# Patient Record
Sex: FEMALE | Race: WHITE | Employment: FULL TIME | URBAN - METROPOLITAN AREA
[De-identification: names, ages, dates, MRNs, and addresses within clinical notes are randomized per-mention and may not be internally consistent; named-entity substitution may affect disease eponyms.]

---

## 2020-07-15 ENCOUNTER — PROCEDURE VISIT (OUTPATIENT)
Dept: UROLOGY | Age: 55
End: 2020-07-15
Payer: COMMERCIAL

## 2020-07-15 VITALS
SYSTOLIC BLOOD PRESSURE: 103 MMHG | HEIGHT: 68 IN | HEART RATE: 66 BPM | BODY MASS INDEX: 23.34 KG/M2 | WEIGHT: 154 LBS | DIASTOLIC BLOOD PRESSURE: 68 MMHG

## 2020-07-15 PROCEDURE — 97750 PHYSICAL PERFORMANCE TEST: CPT | Performed by: NURSE PRACTITIONER

## 2020-07-15 PROCEDURE — 51784 ANAL/URINARY MUSCLE STUDY: CPT | Performed by: NURSE PRACTITIONER

## 2020-07-15 PROCEDURE — 97032 APPL MODALITY 1+ESTIM EA 15: CPT | Performed by: NURSE PRACTITIONER

## 2020-07-15 PROCEDURE — 91122 PR ANAL PRESSURE RECORD: CPT | Performed by: NURSE PRACTITIONER

## 2020-07-15 RX ORDER — MELOXICAM 15 MG/1
TABLET ORAL
COMMUNITY
Start: 2020-07-13 | End: 2020-11-23 | Stop reason: SDUPTHER

## 2020-07-15 RX ORDER — M-VIT,TX,IRON,MINS/CALC/FOLIC 27MG-0.4MG
1 TABLET ORAL DAILY
COMMUNITY

## 2020-07-15 RX ORDER — CETIRIZINE HYDROCHLORIDE 10 MG/1
TABLET ORAL
COMMUNITY
End: 2021-02-04

## 2020-07-15 RX ORDER — ASCORBIC ACID 500 MG
500 TABLET ORAL DAILY
COMMUNITY
End: 2021-12-08 | Stop reason: ALTCHOICE

## 2020-07-15 RX ORDER — GABAPENTIN 100 MG/1
100 CAPSULE ORAL DAILY
COMMUNITY
Start: 2020-07-13 | End: 2021-12-08 | Stop reason: ALTCHOICE

## 2020-07-15 SDOH — HEALTH STABILITY: MENTAL HEALTH: HOW OFTEN DO YOU HAVE A DRINK CONTAINING ALCOHOL?: NEVER

## 2020-07-22 ENCOUNTER — PROCEDURE VISIT (OUTPATIENT)
Dept: UROLOGY | Age: 55
End: 2020-07-22
Payer: COMMERCIAL

## 2020-07-22 VITALS
SYSTOLIC BLOOD PRESSURE: 120 MMHG | TEMPERATURE: 97.7 F | BODY MASS INDEX: 23.42 KG/M2 | WEIGHT: 154 LBS | DIASTOLIC BLOOD PRESSURE: 77 MMHG

## 2020-07-22 PROCEDURE — 91122 PR ANAL PRESSURE RECORD: CPT | Performed by: NURSE PRACTITIONER

## 2020-07-22 PROCEDURE — 51784 ANAL/URINARY MUSCLE STUDY: CPT | Performed by: NURSE PRACTITIONER

## 2020-07-22 PROCEDURE — 97750 PHYSICAL PERFORMANCE TEST: CPT | Performed by: NURSE PRACTITIONER

## 2020-07-22 PROCEDURE — 97032 APPL MODALITY 1+ESTIM EA 15: CPT | Performed by: NURSE PRACTITIONER

## 2020-07-29 ENCOUNTER — PROCEDURE VISIT (OUTPATIENT)
Dept: UROLOGY | Age: 55
End: 2020-07-29
Payer: COMMERCIAL

## 2020-07-29 VITALS
SYSTOLIC BLOOD PRESSURE: 100 MMHG | DIASTOLIC BLOOD PRESSURE: 74 MMHG | WEIGHT: 157 LBS | TEMPERATURE: 97.7 F | BODY MASS INDEX: 23.87 KG/M2

## 2020-07-29 PROCEDURE — 91122 PR ANAL PRESSURE RECORD: CPT | Performed by: NURSE PRACTITIONER

## 2020-07-29 PROCEDURE — 51784 ANAL/URINARY MUSCLE STUDY: CPT | Performed by: NURSE PRACTITIONER

## 2020-07-29 PROCEDURE — 97750 PHYSICAL PERFORMANCE TEST: CPT | Performed by: NURSE PRACTITIONER

## 2020-07-29 PROCEDURE — 97032 APPL MODALITY 1+ESTIM EA 15: CPT | Performed by: NURSE PRACTITIONER

## 2020-07-29 NOTE — PROGRESS NOTES
PELVIC FLOOR REHAB FOLLOW UP    At last visit (PFR # 2, 1 weeks ago): Was able to hold for 4 seconds at weak amplitude. Fatigue after 3 repetitions.      Home exercise regimen prescribed:   Repetitions - 6   Contract - 6 sec   Relax - 10 sec   + 10 Quick Flicks  Frequency- 4 times daily     Stimulated with 62 average mA for 10 minutes.      Symptoms  Daytime voiding frequency: q 3-4 hrs, unchanged  Nighttime voiding frequency: 1 times per night, unchanged  Urgency: mild-moderste decreased    Incontinence episodes per day: dribbles, Causes: not sure of cause, unchanged  Number of pads used per day: pantyliner per day, unchanged    Pelvic pain: some abdominal discomfort from exercises    Bowel habits: unchanged  Fecal incontinence: unchanged    OVERALL IMPROVEMENT SINCE INITIAL SESSION:   minimal in degree    Compliance:  Has pt completed exercises as instructed: Yes    Changes Since Initial Visit  Intake of spicy/citrus/tomato based foods: unchanged  Caffeine intake per day: unchanged  Fluid intake per day: unchanged  Alcohol intake: unchanged  Smoking: unchanged    Was able to hold for 6 seconds at weak amplitude. Fatigue after 7 repetitions. Home exercise regimen prescribed:   Repetitions - 6   Contract - 8 sec   Relax - 10 sec   + 10 Quick Flicks  Frequency- 4 times daily    Stimulated with 59 average mA for 12 minutes.

## 2020-08-05 ENCOUNTER — PROCEDURE VISIT (OUTPATIENT)
Dept: UROLOGY | Age: 55
End: 2020-08-05
Payer: COMMERCIAL

## 2020-08-05 VITALS
SYSTOLIC BLOOD PRESSURE: 104 MMHG | BODY MASS INDEX: 24.02 KG/M2 | TEMPERATURE: 97.9 F | WEIGHT: 158 LBS | DIASTOLIC BLOOD PRESSURE: 69 MMHG | HEART RATE: 69 BPM

## 2020-08-05 PROCEDURE — 51784 ANAL/URINARY MUSCLE STUDY: CPT | Performed by: PHYSICIAN ASSISTANT

## 2020-08-05 PROCEDURE — 97032 APPL MODALITY 1+ESTIM EA 15: CPT | Performed by: PHYSICIAN ASSISTANT

## 2020-08-05 PROCEDURE — 91122 PR ANAL PRESSURE RECORD: CPT | Performed by: PHYSICIAN ASSISTANT

## 2020-08-05 PROCEDURE — 97750 PHYSICAL PERFORMANCE TEST: CPT | Performed by: PHYSICIAN ASSISTANT

## 2020-08-12 ENCOUNTER — PROCEDURE VISIT (OUTPATIENT)
Dept: UROLOGY | Age: 55
End: 2020-08-12
Payer: COMMERCIAL

## 2020-08-12 VITALS
BODY MASS INDEX: 24.02 KG/M2 | HEART RATE: 60 BPM | DIASTOLIC BLOOD PRESSURE: 65 MMHG | SYSTOLIC BLOOD PRESSURE: 109 MMHG | WEIGHT: 158 LBS

## 2020-08-12 PROCEDURE — 91122 PR ANAL PRESSURE RECORD: CPT | Performed by: PHYSICIAN ASSISTANT

## 2020-08-12 PROCEDURE — 97032 APPL MODALITY 1+ESTIM EA 15: CPT | Performed by: PHYSICIAN ASSISTANT

## 2020-08-12 PROCEDURE — 51784 ANAL/URINARY MUSCLE STUDY: CPT | Performed by: PHYSICIAN ASSISTANT

## 2020-08-12 PROCEDURE — 97750 PHYSICAL PERFORMANCE TEST: CPT | Performed by: PHYSICIAN ASSISTANT

## 2020-08-26 ENCOUNTER — PROCEDURE VISIT (OUTPATIENT)
Dept: UROLOGY | Age: 55
End: 2020-08-26
Payer: COMMERCIAL

## 2020-08-26 VITALS
HEART RATE: 73 BPM | SYSTOLIC BLOOD PRESSURE: 109 MMHG | DIASTOLIC BLOOD PRESSURE: 67 MMHG | BODY MASS INDEX: 24.02 KG/M2 | WEIGHT: 158 LBS

## 2020-08-26 PROCEDURE — 51784 ANAL/URINARY MUSCLE STUDY: CPT | Performed by: NURSE PRACTITIONER

## 2020-08-26 PROCEDURE — 97032 APPL MODALITY 1+ESTIM EA 15: CPT | Performed by: NURSE PRACTITIONER

## 2020-08-26 PROCEDURE — 97750 PHYSICAL PERFORMANCE TEST: CPT | Performed by: NURSE PRACTITIONER

## 2020-08-26 PROCEDURE — 91122 PR ANAL PRESSURE RECORD: CPT | Performed by: NURSE PRACTITIONER

## 2020-08-26 NOTE — PROGRESS NOTES
PELVIC FLOOR REHAB FOLLOW UP    At last visit (PFR # 5, 2 weeks ago): Was able to hold for 8 seconds at moderate wavy amplitude. Fatigue after 7 repetitions.      Home exercise regimen prescribed:   Repetitions - 8  Contract - 8 sec   Relax - 89 CPC   + 69 OLXMT Flicks  Frequency- 4 times daily     Stimulated with 53 average mA for 15 minutes.     Symptoms  Daytime voiding frequency: q 3-4 hrs, unchanged  Nighttime voiding frequency: 0-1 times per night, decreased  Urgency: mild, decreased    Incontinence episodes per day: 0, Causes: none, decreased  Number of pads used per day: 1 for protection, decreased    Pelvic pain: unchanged    Bowel habits: unchanged  Fecal incontinence: unchanged    OVERALL IMPROVEMENT SINCE INITIAL SESSION:   mild in degree    Compliance:  Has pt completed exercises as instructed: Yes    Changes Since Initial Visit  Intake of spicy/citrus/tomato based foods: unchanged  Caffeine intake per day: unchanged  Fluid intake per day: decreased  Alcohol intake: unchanged  Smoking: unchanged      Was able to hold for 5-6 seconds at weak amplitude. Significant use of abdominal muscles. Fatigue after 6 repetitions. Home exercise regimen prescribed:   Repetitions - 8   Contract - 6 sec   Relax - 10 sec   + 10 Quick Flicks  Frequency- 4 times daily    Stimulated with 41 average mA for 15 minutes.

## 2020-09-02 ENCOUNTER — PROCEDURE VISIT (OUTPATIENT)
Dept: UROLOGY | Age: 55
End: 2020-09-02
Payer: COMMERCIAL

## 2020-09-02 VITALS
SYSTOLIC BLOOD PRESSURE: 110 MMHG | DIASTOLIC BLOOD PRESSURE: 68 MMHG | BODY MASS INDEX: 24.02 KG/M2 | HEART RATE: 67 BPM | WEIGHT: 158 LBS

## 2020-09-02 PROCEDURE — 97750 PHYSICAL PERFORMANCE TEST: CPT | Performed by: NURSE PRACTITIONER

## 2020-09-02 PROCEDURE — 97032 APPL MODALITY 1+ESTIM EA 15: CPT | Performed by: NURSE PRACTITIONER

## 2020-09-02 PROCEDURE — 91122 PR ANAL PRESSURE RECORD: CPT | Performed by: NURSE PRACTITIONER

## 2020-09-02 PROCEDURE — 51784 ANAL/URINARY MUSCLE STUDY: CPT | Performed by: NURSE PRACTITIONER

## 2020-09-02 NOTE — PROGRESS NOTES
PELVIC FLOOR REHAB FOLLOW UP    At last visit (PFR # 6, 1 weeks ago): Was able to hold for 5-6 seconds at weak amplitude. Significant use of abdominal muscles. Fatigue after 6 repetitions.      Home exercise regimen prescribed:   Repetitions - 8   Contract - 6 sec   Relax - 10 sec   + 10 Quick Flicks  Frequency- 4 times daily     Stimulated with 41 average mA for 15 minutes.        Symptoms  Daytime voiding frequency: q 3-4 hrs, unchanged  Nighttime voiding frequency: 0-1 times per night, unchanged  Urgency: mild, unchanged    Incontinence episodes per day: 0, Causes: none, decreased  Number of pads used per day: 1, pantyliner unchanged    Pelvic pain: unchanged    Bowel habits: unchanged  Fecal incontinence: unchanged    OVERALL IMPROVEMENT SINCE INITIAL SESSION:   minimal in degree    Compliance:  Has pt completed exercises as instructed: Yes    Changes Since Initial Visit  Intake of spicy/citrus/tomato based foods: unchanged  Caffeine intake per day: unchanged  Fluid intake per day: unchanged  Alcohol intake: unchanged  Smoking: unchanged      Was able to hold for 6-8 seconds at weak-moderate amplitude, less abdominal muscles  Fatigue after 9 repetitions. Home exercise regimen prescribed:   Repetitions - 10   Contract - 8 sec   Relax - 10 sec   + 10 Quick Flicks  Frequency- 4 times daily    Stimulated with 69 average mA for 15 minutes.

## 2020-09-16 ENCOUNTER — PROCEDURE VISIT (OUTPATIENT)
Dept: UROLOGY | Age: 55
End: 2020-09-16
Payer: COMMERCIAL

## 2020-09-16 VITALS — BODY MASS INDEX: 24.02 KG/M2 | SYSTOLIC BLOOD PRESSURE: 106 MMHG | WEIGHT: 158 LBS | DIASTOLIC BLOOD PRESSURE: 68 MMHG

## 2020-09-16 PROCEDURE — 51784 ANAL/URINARY MUSCLE STUDY: CPT | Performed by: NURSE PRACTITIONER

## 2020-09-16 PROCEDURE — 97032 APPL MODALITY 1+ESTIM EA 15: CPT | Performed by: NURSE PRACTITIONER

## 2020-09-16 PROCEDURE — 97750 PHYSICAL PERFORMANCE TEST: CPT | Performed by: NURSE PRACTITIONER

## 2020-09-16 PROCEDURE — 91122 PR ANAL PRESSURE RECORD: CPT | Performed by: NURSE PRACTITIONER

## 2020-09-16 RX ORDER — METHYLPREDNISOLONE 4 MG/1
TABLET ORAL
COMMUNITY
Start: 2020-09-15 | End: 2020-12-20

## 2020-12-16 PROBLEM — M51.36 DDD (DEGENERATIVE DISC DISEASE), LUMBAR: Status: ACTIVE | Noted: 2020-12-16

## 2020-12-16 PROBLEM — M47.816 LUMBAR SPONDYLOSIS: Status: ACTIVE | Noted: 2020-12-16

## 2020-12-16 PROBLEM — M47.817 LUMBOSACRAL SPONDYLOSIS WITHOUT MYELOPATHY: Status: ACTIVE | Noted: 2020-12-16

## 2021-08-11 PROBLEM — M75.21 BICEPS TENDINITIS OF RIGHT SHOULDER: Status: ACTIVE | Noted: 2021-08-11

## 2021-08-11 PROBLEM — M75.81 TENDINITIS OF RIGHT ROTATOR CUFF: Status: ACTIVE | Noted: 2021-08-11
